# Patient Record
Sex: FEMALE | Race: WHITE | URBAN - METROPOLITAN AREA
[De-identification: names, ages, dates, MRNs, and addresses within clinical notes are randomized per-mention and may not be internally consistent; named-entity substitution may affect disease eponyms.]

---

## 2017-01-19 ENCOUNTER — ALLSCRIPTS OFFICE VISIT (OUTPATIENT)
Dept: OTHER | Facility: OTHER | Age: 82
End: 2017-01-19

## 2017-02-23 ENCOUNTER — ALLSCRIPTS OFFICE VISIT (OUTPATIENT)
Dept: OTHER | Facility: OTHER | Age: 82
End: 2017-02-23

## 2017-03-30 ENCOUNTER — ALLSCRIPTS OFFICE VISIT (OUTPATIENT)
Dept: OTHER | Facility: OTHER | Age: 82
End: 2017-03-30

## 2017-05-04 ENCOUNTER — ALLSCRIPTS OFFICE VISIT (OUTPATIENT)
Dept: OTHER | Facility: OTHER | Age: 82
End: 2017-05-04

## 2017-06-08 ENCOUNTER — ALLSCRIPTS OFFICE VISIT (OUTPATIENT)
Dept: OTHER | Facility: OTHER | Age: 82
End: 2017-06-08

## 2017-07-13 ENCOUNTER — ALLSCRIPTS OFFICE VISIT (OUTPATIENT)
Dept: OTHER | Facility: OTHER | Age: 82
End: 2017-07-13

## 2017-08-17 ENCOUNTER — ALLSCRIPTS OFFICE VISIT (OUTPATIENT)
Dept: OTHER | Facility: OTHER | Age: 82
End: 2017-08-17

## 2017-10-05 ENCOUNTER — ALLSCRIPTS OFFICE VISIT (OUTPATIENT)
Dept: OTHER | Facility: OTHER | Age: 82
End: 2017-10-05

## 2017-11-16 ENCOUNTER — ALLSCRIPTS OFFICE VISIT (OUTPATIENT)
Dept: OTHER | Facility: OTHER | Age: 82
End: 2017-11-16

## 2017-12-28 ENCOUNTER — GENERIC CONVERSION - ENCOUNTER (OUTPATIENT)
Dept: OTHER | Facility: OTHER | Age: 82
End: 2017-12-28

## 2017-12-28 ENCOUNTER — ALLSCRIPTS OFFICE VISIT (OUTPATIENT)
Dept: OTHER | Facility: OTHER | Age: 82
End: 2017-12-28

## 2018-02-01 ENCOUNTER — CLINICAL SUPPORT (OUTPATIENT)
Dept: CARDIOLOGY CLINIC | Facility: CLINIC | Age: 83
End: 2018-02-01
Payer: MEDICARE

## 2018-02-01 DIAGNOSIS — I49.5 SICK SINUS SYNDROME (HCC): Primary | ICD-10-CM

## 2018-02-01 DIAGNOSIS — Z95.0 PRESENCE OF CARDIAC PACEMAKER: ICD-10-CM

## 2018-02-01 PROCEDURE — 93293 PM PHONE R-STRIP DEVICE EVAL: CPT | Performed by: INTERNAL MEDICINE

## 2018-02-01 NOTE — PROGRESS NOTES
DEVICE IS PAULETTE  PT WILL BE SCHEDULED FOR H&P AND PAULETTE CONFIRMATION  NON-MAGNET= 100% A-V SEQUENTIAL PACING WITH CAPTURE  MAGNET= ASYNCHRONOUS A-V PACING WITH CAPTURE  POST-MAGNET= AV SEQUENTIAL PACING WITH CAPTURE  NORMAL DEVICE FUNCTION  DL     No current outpatient prescriptions on file

## 2018-02-22 RX ORDER — BUSPIRONE HYDROCHLORIDE 5 MG/1
TABLET ORAL
COMMUNITY
Start: 2014-12-01

## 2018-02-22 RX ORDER — DONEPEZIL HYDROCHLORIDE 10 MG/1
TABLET, FILM COATED ORAL
COMMUNITY
End: 2018-02-23 | Stop reason: ALTCHOICE

## 2018-02-22 RX ORDER — MIRTAZAPINE 30 MG/1
1 TABLET, FILM COATED ORAL
COMMUNITY
Start: 2013-03-11 | End: 2018-02-23 | Stop reason: ALTCHOICE

## 2018-02-22 RX ORDER — PROCHLORPERAZINE MALEATE 10 MG
TABLET ORAL
COMMUNITY
End: 2018-02-23 | Stop reason: ALTCHOICE

## 2018-02-22 RX ORDER — DILTIAZEM HYDROCHLORIDE 240 MG/1
CAPSULE, EXTENDED RELEASE ORAL DAILY
COMMUNITY
Start: 2013-02-20 | End: 2018-04-11 | Stop reason: HOSPADM

## 2018-02-22 RX ORDER — HYDRALAZINE HYDROCHLORIDE 25 MG/1
TABLET, FILM COATED ORAL
COMMUNITY
Start: 2014-12-06 | End: 2018-02-23 | Stop reason: ALTCHOICE

## 2018-02-22 RX ORDER — CLOTRIMAZOLE 1 %
CREAM (GRAM) TOPICAL
COMMUNITY
Start: 2015-03-25 | End: 2018-02-23 | Stop reason: ALTCHOICE

## 2018-02-22 RX ORDER — TRAMADOL HYDROCHLORIDE 50 MG/1
TABLET ORAL
COMMUNITY
End: 2018-02-23 | Stop reason: ALTCHOICE

## 2018-02-22 RX ORDER — LORATADINE 10 MG/1
TABLET ORAL
COMMUNITY

## 2018-02-22 RX ORDER — AMMONIUM LACTATE 12 G/100G
LOTION TOPICAL
COMMUNITY
Start: 2015-03-06 | End: 2018-02-23 | Stop reason: ALTCHOICE

## 2018-02-22 RX ORDER — GUAIFENESIN 100 MG/5ML
SOLUTION ORAL
COMMUNITY
End: 2018-02-23 | Stop reason: ALTCHOICE

## 2018-02-22 RX ORDER — ESCITALOPRAM OXALATE 10 MG/1
1 TABLET ORAL DAILY
COMMUNITY

## 2018-02-22 RX ORDER — CLOTRIMAZOLE AND BETAMETHASONE DIPROPIONATE 10; .64 MG/G; MG/G
CREAM TOPICAL
COMMUNITY
Start: 2014-12-12 | End: 2018-02-23 | Stop reason: ALTCHOICE

## 2018-02-22 RX ORDER — POTASSIUM CHLORIDE 20 MEQ/1
TABLET, EXTENDED RELEASE ORAL
COMMUNITY
Start: 2015-03-14 | End: 2018-02-23 | Stop reason: ALTCHOICE

## 2018-02-22 RX ORDER — CYCLOSPORINE 0.5 MG/ML
EMULSION OPHTHALMIC
COMMUNITY
End: 2018-02-23 | Stop reason: ALTCHOICE

## 2018-02-22 RX ORDER — ATORVASTATIN CALCIUM 20 MG/1
1 TABLET, FILM COATED ORAL DAILY
COMMUNITY
End: 2018-02-23 | Stop reason: ALTCHOICE

## 2018-02-22 RX ORDER — IPRATROPIUM BROMIDE AND ALBUTEROL SULFATE 2.5; .5 MG/3ML; MG/3ML
SOLUTION RESPIRATORY (INHALATION)
COMMUNITY
Start: 2015-01-21 | End: 2018-02-23 | Stop reason: ALTCHOICE

## 2018-02-22 RX ORDER — CERAMIDES 1,3,6-II
CREAM (GRAM) TOPICAL
COMMUNITY
End: 2018-02-23 | Stop reason: ALTCHOICE

## 2018-02-22 RX ORDER — METOPROLOL TARTRATE 100 MG/1
TABLET ORAL
COMMUNITY
Start: 2015-03-14 | End: 2018-02-23 | Stop reason: ALTCHOICE

## 2018-02-22 RX ORDER — ATORVASTATIN CALCIUM 40 MG/1
TABLET, FILM COATED ORAL
COMMUNITY
Start: 2014-12-01 | End: 2018-02-23 | Stop reason: ALTCHOICE

## 2018-02-22 RX ORDER — HYDRALAZINE HYDROCHLORIDE 50 MG/1
TABLET, FILM COATED ORAL
COMMUNITY
Start: 2014-11-12 | End: 2018-02-23 | Stop reason: ALTCHOICE

## 2018-02-22 RX ORDER — METHYLPHENIDATE HYDROCHLORIDE 5 MG/1
0.5 TABLET ORAL DAILY
COMMUNITY
Start: 2013-02-21 | End: 2018-02-23 | Stop reason: ALTCHOICE

## 2018-02-22 RX ORDER — SUCRALFATE 1 G/1
TABLET ORAL
COMMUNITY
End: 2018-02-23 | Stop reason: ALTCHOICE

## 2018-02-22 RX ORDER — METOPROLOL TARTRATE 100 MG/1
100 TABLET ORAL
COMMUNITY
Start: 2011-05-30

## 2018-02-22 RX ORDER — PANTOPRAZOLE SODIUM 40 MG/1
1 TABLET, DELAYED RELEASE ORAL 2 TIMES DAILY
COMMUNITY
End: 2018-02-23 | Stop reason: ALTCHOICE

## 2018-02-23 ENCOUNTER — CLINICAL SUPPORT (OUTPATIENT)
Dept: CARDIOLOGY CLINIC | Facility: CLINIC | Age: 83
End: 2018-02-23
Payer: MEDICARE

## 2018-02-23 ENCOUNTER — OFFICE VISIT (OUTPATIENT)
Dept: CARDIOLOGY CLINIC | Facility: CLINIC | Age: 83
End: 2018-02-23
Payer: MEDICARE

## 2018-02-23 VITALS — DIASTOLIC BLOOD PRESSURE: 68 MMHG | SYSTOLIC BLOOD PRESSURE: 102 MMHG | HEART RATE: 60 BPM | HEIGHT: 64 IN

## 2018-02-23 DIAGNOSIS — Z45.010 ENCNTR FOR CHECKING AND TEST OF CARD PACEMAKER PULSE GNRTR: ICD-10-CM

## 2018-02-23 DIAGNOSIS — I49.8 PACEMAKER-DEPENDENT DUE TO NATIVE CARDIAC RHYTHM INSUFFICIENT TO SUPPORT LIFE: ICD-10-CM

## 2018-02-23 DIAGNOSIS — I49.5 TACHY-BRADY SYNDROME (HCC): ICD-10-CM

## 2018-02-23 DIAGNOSIS — I49.5 SICK SINUS SYNDROME (HCC): Primary | ICD-10-CM

## 2018-02-23 DIAGNOSIS — Z95.0 PACEMAKER-DEPENDENT DUE TO NATIVE CARDIAC RHYTHM INSUFFICIENT TO SUPPORT LIFE: ICD-10-CM

## 2018-02-23 DIAGNOSIS — Z95.0 CARDIAC PACEMAKER IN SITU: ICD-10-CM

## 2018-02-23 DIAGNOSIS — Z45.010 PACEMAKER AT END OF BATTERY LIFE: Primary | ICD-10-CM

## 2018-02-23 PROCEDURE — 99204 OFFICE O/P NEW MOD 45 MIN: CPT | Performed by: INTERNAL MEDICINE

## 2018-02-23 PROCEDURE — 99024 POSTOP FOLLOW-UP VISIT: CPT | Performed by: INTERNAL MEDICINE

## 2018-02-23 PROCEDURE — 93000 ELECTROCARDIOGRAM COMPLETE: CPT | Performed by: INTERNAL MEDICINE

## 2018-02-23 RX ORDER — DILTIAZEM HYDROCHLORIDE 240 MG/1
240 CAPSULE, COATED, EXTENDED RELEASE ORAL DAILY
COMMUNITY

## 2018-02-23 RX ORDER — ASPIRIN 81 MG/1
81 TABLET ORAL DAILY
COMMUNITY

## 2018-02-23 NOTE — PROGRESS NOTES
[facility]E- NONBILLABLE  BATTERY INDICATED ERT 1/26/18  MONITORING WAS ONLY DONE VIA TTM'S PER NH/FAMILY'S REQUEST; LAST INTERRO DONE IN [facility] 1/25/15  AP-71%, -99%  ALL AVAILABLE LEAD PARAMETERS WITHIN NORMAL LIMITS  PT TO SEE DR Claire Mcpherson   GV

## 2018-02-23 NOTE — PROGRESS NOTES
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[address] )    -  Primary    Relevant Medications    diltiazem (DILACOR XR) 240 MG 24 hr capsule    metoprolol tartrate (LOPRESSOR) 100 mg tablet    diltiazem (CARDIZEM CD) 240 mg 24 hr capsule    Other Relevant Orders    POCT ECG          81 yo female  1) S/p [place] dual chamber PPM placed 2010, last full check in 2015 leads were ok and remotes w TTM show now at [facility]  99% V paced  2) Diastolic CHF EF normal 4978  3) Wheelchair bound in SNF, likely has dementia    Daughter here concerned about End of life decisions, if worth changing pacemaker    PLAN:  1  If she were interested in pursuing hospice/palliative care I would not reocmmend pacemaker change, but since they had not made that decision yet, seems worth proceeding w Gen change as she likely would not survive without pacemaker since 99% V paced  I did explain despite older age she should tolerate relatively  surgery of generator change w/o issue  Informed Consent: Risks, benefits, and alternatives to Implantable cardiac device surgery was  discussed with patient and any family present  The patient and/or the patients designated decision maker understands risks, which include but are not limited to life threatening  bleeding, infection, air around lungs, blood around the heart, stroke, open heart surgery, death and reoperation dislodged or malfunctioning device  Reoperation due to device dislodgment is a fairly common complication where more serious complications are rare         Orders Placed This Encounter   Procedures    POCT ECG     Medications Discontinued During This Encounter Medication Reason    ammonium lactate (LAC-HYDRIN) 12 % lotion Therapy completed    atorvastatin (LIPITOR) 20 mg tablet Therapy completed    atorvastatin (LIPITOR) 40 mg tablet Therapy completed    clotrimazole (LOTRIMIN) 1 % cream Therapy completed    clotrimazole-betamethasone (LOTRISONE) 1-0 05 % cream Therapy completed    conjugated estrogens (PREMARIN) 0 3 mg tablet Therapy completed    cycloSPORINE (RESTASIS) 0 05 % ophthalmic emulsion Therapy completed    donepezil (ARICEPT) 10 mg tablet Therapy completed    Emollient (CERAVE) CREA Therapy completed    Glycerin-Hypromellose- 0 2-0 2-1 % SOLN Therapy completed    guaiFENesin (ROBITUSSIN) 100 mg/5 mL oral solution Therapy completed    hydrALAZINE (APRESOLINE) 25 mg tablet Therapy completed    hydrALAZINE (APRESOLINE) 50 mg tablet Therapy completed    ipratropium-albuterol (DUO-NEB) 0 5-2 5 mg/3 mL Therapy completed    methylphenidate (RITALIN) 5 mg tablet Therapy completed    metoprolol tartrate (LOPRESSOR) 100 mg tablet Therapy completed    metoprolol tartrate (LOPRESSOR) 25 mg tablet Therapy completed    mirtazapine (REMERON) 30 mg tablet Therapy completed    pantoprazole (PROTONIX) 40 mg tablet Therapy completed    potassium chloride (K-DUR,KLOR-CON) 20 mEq tablet Therapy completed    prochlorperazine (COMPAZINE) 10 mg tablet Therapy completed    sucralfate (CARAFATE) 1 g tablet Therapy completed    traMADol (ULTRAM) 50 mg tablet Therapy completed             HPI/Subjective:     81 yo female  1) S/p [place] dual chamber PPM placed 2010, last full check in 2015 leads were ok and remotes w TTM show now at [facility]  99% V paced    2) Diastolic CHF EF normal 8542  3) Wheelchair bound in SNF, likely has dementia    Daughter here concerned about End of life decisions, if worth changing pacemaker    Please note HPI is listed by problem with with update following it, it is copied again in the assessment above and reflects medical decision making as well  Complete 12 point ROS reviewed and otherwise non pertinent or negative except as per HPI pertinent positives in Cardiovascular and Respiratory emphasized  Please see paper chart for outpatient clinic patients where the patient completed the 12 point ROS survey  No past medical history on file  Allergies   Allergen Reactions    Cephalexin     Hydrocodone-Acetaminophen     Propoxyphene     Zolpidem      I reviewed the Home Medication list and Allergies in the chart  Scheduled Meds:  Current Outpatient Prescriptions   Medication Sig Dispense Refill    aspirin (ECOTRIN LOW STRENGTH) 81 mg EC tablet Take 81 mg by mouth daily      busPIRone (BUSPAR) 5 mg tablet Take by mouth      diltiazem (CARDIZEM CD) 240 mg 24 hr capsule Take 240 mg by mouth daily      diltiazem (DILACOR XR) 240 MG 24 hr capsule Take by mouth Daily      escitalopram (LEXAPRO) 10 mg tablet Take 1 tablet by mouth daily      loratadine (CLARITIN) 10 mg tablet Take by mouth      metoprolol tartrate (LOPRESSOR) 100 mg tablet Take 100 mg by mouth         No current facility-administered medications for this visit  PRN Meds:  No family history on file  Social History     Social History    Marital status: /Civil Union     Spouse name: N/A    Number of children: N/A    Years of education: N/A     Occupational History    Not on file  Social History Main Topics    Smoking status: Not on file    Smokeless tobacco: Not on file    Alcohol use Not on file    Drug use: Unknown    Sexual activity: Not on file     Other Topics Concern    Not on file     Social History Narrative    No narrative on file         OBJECTIVE:    /68 (BP Location: Left arm, Patient Position: Sitting, Cuff Size: Large)   Pulse 60   Ht 5' 4" (1 626 m) There were no vitals filed for this visit    GEN: No acute distress, Alert but does not answer questions  HEENT:Head, neck, ears, oral pharynx: Mucus membranes moist, oral pharynx clear, nares clear  External ears normal  EYES: Pupils equal, sclera anicteric, midline, normal conjuctiva  NECK: No JVD, supple, no obvious masses or thryomegaly or goiter  CARDIOVASCULAR:  RRR, No murmur, rub, gallops S1,S2  LUNGS: Clear To auscultation bilaterally, normal effort, no rales, rhonchi, crackles  ABDOMEN:  nondistended,  without obvious organomegaly or ascites  EXTREMITIES/VASCULAR: 2+ pitting  Radial pulses intact, pedal pulses difficult to palpate, warm an well perfused  PSYCH: Normal Affect, no overt suicidal ideation, linear speech pattern without evidence of psychosis  NEURO: Grossly intact, moving all extremiteis equal, face symmetric, alert and responsive, no obvious focal defecits  GAIT: wheelchair bound  HEME: No bleeding, bruising, petechia, purpura  SKIN: No significant rashes, warm, no diaphoresis or pallor       Lab Results:     @RESULTRCNT(1M])@    CBC with diff:     CMP:    TSH:       Lipid Profile:          Cardiac testing:   Results for orders placed in visit on 01/25/15   Echo complete with contrast if indicated    Narrative [facility]   [facility], [address] [place] [address]   Phone: [phone]   TRANSTHORACIC ECHOCARDIOGRAM   2D, M-MODE, DOPPLER, AND COLOR DOPPLER   Study date:  2015   Patient: Nissa Nelson   MR number: [id number]   Account number: [de-identified]   : 1930   Age: 80 years   Gender: Female   Status: Inpatient   Location: Bedside   Height: 64 in   Weight: 174 7 lb   BP: 160/ 74 mmHg   Indications: Syncope   Diagnoses: 780 2 - SYNCOPE AND COLLAPSE   Sonographer:  GREGORIO Lezama   Primary Physician:  Hitesh Zaldivar DO   Referring Physician:  Reuben Hidalgo MD   Group:  [facility]   Interpreting Physician:  Rambo Ahmadi DO   SUMMARY   LEFT VENTRICLE:   Systolic function was normal  Ejection fraction was estimated in the range of   65 % to 70 %  There were no regional wall motion abnormalities  MITRAL VALVE:   There was mild regurgitation  AORTIC VALVE:   There was mild regurgitation  TRICUSPID VALVE:   There was mild regurgitation  AORTA:   The root exhibited mild dilatation  HISTORY: PRIOR HISTORY: HTN, HLD, Dementia, Pacemaker, Afib, Melanoma, Chest   pain   PROCEDURE: The procedure was performed at the bedside  This was a routine   TRANSTHORACIC ECHOCARDIOGRAM   Patient: Dino Diallo   MR number: [id number]    ------ Page 2   study  The transthoracic approach was used  The study included complete 2D   imaging, M-mode, complete spectral Doppler, and color Doppler  The heart rate   was 60 bpm, at the start of the study  Images were obtained from the   parasternal, apical, subcostal, and suprasternal notch acoustic windows  Echocardiographic views were limited due to lung interference  This was a   technically difficult study  LEFT VENTRICLE: Size was normal  Systolic function was normal  Ejection   fraction was estimated in the range of 65 % to 70 %  There were no regional   wall motion abnormalities  Wall thickness was normal  DOPPLER: Left    ventricular   diastolic function parameters were normal for the patient's age  RIGHT VENTRICLE: The size was normal  Systolic function was normal  Wall   thickness was normal  A pacing wire was present  LEFT ATRIUM: Size was normal    RIGHT ATRIUM: Size was normal    MITRAL VALVE: Valve structure was normal  There was mild thickening  There was   normal leaflet separation  DOPPLER: The transmitral velocity was within the   normal range  There was no evidence for stenosis  There was mild    regurgitation  AORTIC VALVE: The valve was trileaflet  Leaflets exhibited mildly increased   thickness, normal cuspal separation, and sclerosis  DOPPLER: Transaortic   velocity was within the normal range   There was no evidence for stenosis  There   was mild regurgitation  TRICUSPID VALVE: The valve structure was normal  There was normal leaflet   separation  DOPPLER: The transtricuspid velocity was within the normal range  There was no evidence for stenosis  There was mild regurgitation  Estimated   peak PA pressure was 35 mmHg  PULMONIC VALVE: Leaflets exhibited normal thickness, no calcification, and   normal cuspal separation  DOPPLER: The transpulmonic velocity was within the   normal range  There was no regurgitation  PERICARDIUM: There was no pericardial effusion  The pericardium was normal in   appearance  AORTA: The root exhibited mild dilatation  SYSTEMIC VEINS: IVC: The inferior vena cava was not well visualized  SYSTEM MEASUREMENT TABLES   2D   %FS: 51 55 %   AV Diam: 3 74 cm   EDV(Teich): 111 71 ml   TRANSTHORACIC ECHOCARDIOGRAM   Patient: Kb Horner   MR number: [id number]    ------ Page 3   EF(Teich): 82 62 %   ESV(Teich): 19 41 ml   IVSd: 0 99 cm   LA Area: 13 49 cm2   LA Diam: 3 12 cm   LVEDV MOD A4C: 61 72 ml   LVEF MOD A4C: 66 8 %   LVESV MOD A4C: 20 49 ml   LVIDd: 4 88 cm   LVIDs: 2 36 cm   LVLd A4C: 8 03 cm   LVLs A4C: 6 84 cm   LVPWd: 1 13 cm   RA Area: 17 98 cm2   RVIDd: 3 24 cm   SV MOD A4C: 41 23 ml   SV(Teich): 92 3 ml   CW   AR Dec Stanly: 2 37 m/s2   AR Dec Time: 2122 12 ms   AR PHT: 615 41 ms   AR Vmax: 5 03 m/s   AR maxP 26 mmHg   TR Vmax: 2 69 m/s   TR maxP 02 mmHg   MM   TAPSE: 2 51 cm   PW   MV A Hoang: 0 98 m/s   MV Dec Stanly: 3 19 m/s2   MV DecT: 235 97 ms   MV E Hoang: 0 75 m/s   MV E/A Ratio: 0 77   MV PHT: 68 43 ms   MVA By PHT: 3 21 cm2   [employer] [facility]redited [facility]   Prepared and electronically signed by   Phill Galicia DO   Signed 2015 15:29:57      No results found for this or any previous visit  No results found for this or any previous visit  No results found for this or any previous visit          I reviewed and interpreted the following LABS/EKG/TELE/IMAGING and below is summary of my interpretation (if data available): Interrogation of Pacemaker/Defibrillator/Loop (prior recorded events), etc: 2015 pacemaker check 99% VPaced DDD mode, lead paparemets appropriate   Today check she is in back up mode

## 2018-03-07 NOTE — PROGRESS NOTES
"  Discussion/Summary  Normal device function      Results/Data  Cardiac Device TTM [id number] 01:12PM Ochoa Castano     Test Name Result Flag Reference   Cardiac Electrophysiology Report      [id number] pdf   DEVICE TYPE Pacemaker     MISCELLANEOUS COMMENT (Report)     1ST STAGE, CONTINUE TO MONITOR VIA TTMS  NON-MAGNET= A-V SEQUENTIAL PACING WITH CAPTURE WITH SOME VENTRICULAR PACING  MAGNET= ASYNCHRONOUS A-V PACING WITH CAPTURE  POST-MAGNET= AV SEQUENTIAL PACING WITH CAPTURE WITH SOME VENTRICULAR PACING  NORMAL DEVICE FUNCTION   DL     Signatures   Electronically signed by : Sanya Walker, ; May  4 2017  9:21AM EST                       (Author)    Electronically signed by : Jose Greenfield DO; May  5 2017  6:07PM EST                       (Author)    "

## 2018-03-07 NOTE — PROGRESS NOTES
"  Discussion/Summary  Normal device function      Results/Data  Cardiac Device TTM [id number] 01:33PM Aurelio Morales     Test Name Result Flag Reference   Cardiac Electrophysiology Report      [id number]  pdf   DEVICE TYPE Pacemaker     MISCELLANEOUS COMMENT (Report)     1ST STAGE, CONTINUE TO MONITOR VIA TTMS  NON-MAGNET= A-V SEQUENTIAL PACING WITH CAPTURE WITH VENTRICULAR PACING  MAGNET= ASYNCHRONOUS A-V PACING WITH CAPTURE  POST-MAGNET= AV SEQUENTIAL PACING WITH CAPTURE WITH VENTRICULAR PACING AND RARE INTRINSIC BEATS  NORMAL DEVICE FUNCTION   DL     Signatures   Electronically signed by : Nicanor Gee, ; Jul 13 2017  9:40AM EST                       (Author)    Electronically signed by : DANIEL Ellison ; Jul 13 2017  4:52PM EST                       (Author)    "

## 2018-03-07 NOTE — PROGRESS NOTES
"  Discussion/Summary  Normal device function      Results/Data  Cardiac Device TTM [id number] 03:13PM Shelly Vasques     Test Name Result Flag Reference   Cardiac Electrophysiology Report      [id number]  pdf   DEVICE TYPE Pacemaker     MISCELLANEOUS COMMENT (Report)     NON-MAGNET= A-V SEQUENTIAL PACING WITH CAPTURE WITH RARE VENTRICULAR PACING AND INTRINSIC BEATS  MAGNET= ASYNCHRONOUS A-V PACING WITH CAPTURE  POST-MAGNET= AV SEQUENTIAL PACING WITH CAPTURE WITH RARE VENTRICULAR PACING AND INTRINSIC BEATS  NORMAL DEVICE FUNCTION   DL     Signatures   Electronically signed by : Meeta López, ; Sep 22 2016 11:28AM EST                       (Author)    Electronically signed by : DANIEL Francisco ; Sep 22 2016 12:15PM EST                       (Author)    "

## 2018-03-07 NOTE — PROGRESS NOTES
"  Discussion/Summary  Normal device function      Results/Data  Cardiac Device TTM [id number] 03:41PM Narda Miller     Test Name Result Flag Reference   Cardiac Electrophysiology Report      [id number] pdf   DEVICE TYPE Pacemaker     MISCELLANEOUS COMMENT (Report)     1ST STAGE, CONTINUE TO MONITOR VIA TTMS  NON-MAGNET= A-V SEQUENTIAL PACING WITH CAPTURE WITH SOME VENTRICULAR PACING  MAGNET= ASYNCHRONOUS A-V PACING WITH CAPTURE  POST-MAGNET= AV SEQUENTIAL PACING WITH CAPTURE WITH SOME VENTRICULAR PACING  NORMAL DEVICE FUNCTION   DL     Signatures   Electronically signed by : Salina Berry, ; Jan 19 2017 10:51AM EST                       (Author)    Electronically signed by : Sushila Kaur DO; Jan 22 2017  5:36PM EST                       (Author)    "

## 2018-03-07 NOTE — PROGRESS NOTES
"  Discussion/Summary  Normal device function      Results/Data  Cardiac Device TTM [id number] 01:28PM Yenni Polanco     Test Name Result Flag Reference   Cardiac Electrophysiology Report      [id number]  pdf   DEVICE TYPE Pacemaker     MISCELLANEOUS COMMENT (Report)     1ST STAGE, CONTINUE TO MONITOR VIA TTMS  NON-MAGNET= A-V SEQUENTIAL PACING WITH CAPTURE ,VENTRICULAR PACING AND PVCs  MAGNET= ASYNCHRONOUS A-V PACING WITH CAPTURE  POST-MAGNET= AV SEQUENTIAL PACING WITH CAPTURE AND VENTRICULAR PACING  NORMAL DEVICE FUNCTION   DL     Signatures   Electronically signed by : Freda Toledo, ; Aug 17 2017  9:38AM EST                       (Author)    Electronically signed by : DANIEL Gross ; Aug 17 2017  1:18PM EST                       (Author)    "

## 2018-03-07 NOTE — PROGRESS NOTES
"  Discussion/Summary  Normal device function      Results/Data  Cardiac Device TTM [id number] 01:28PM Nely Hogan     Test Name Result Flag Reference   Cardiac Electrophysiology Report      [id number] pdf   DEVICE TYPE Pacemaker     MISCELLANEOUS COMMENT      1ST STAGE, CONTINUE TO MONITOR VIA TTMS  NON-MAGNET= 100% A-V SEQUENTIAL PACING WITH CAPTURE  MAGNET= ASYNCHRONOUS A-V PACING WITH CAPTURE  POST-MAGNET= AV SEQUENTIAL PACING WITH CAPTURE WITH SOME VENTRICULAR PACING  NORMAL DEVICE FUNCTION   DL     Signatures   Electronically signed by : Jey Guzman, ; Jun 8 2017  9:40AM EST                       (Author)    Electronically signed by : DANIEL Taylor ; Jun 8 2017  2:49PM EST                       (Author)    "

## 2018-03-07 NOTE — PROGRESS NOTES
"  Discussion/Summary  Normal device function      Results/Data  Cardiac Device TTM [id number] 03:12PM Mishel Green     Test Name Result Flag Reference   Cardiac Electrophysiology Report      [id number]  pdf   DEVICE TYPE Pacemaker     MISCELLANEOUS COMMENT (Report)     1ST STAGE, CONTINUE TO MONITOR VIA TTMS  NON-MAGNET= A-V SEQUENTIAL PACING WITH CAPTURE WITH ATRIAL AND VENTRICULAR PACING  MAGNET= ASYNCHRONOUS A-V PACING WITH CAPTURE  POST-MAGNET= AV SEQUENTIAL PACING WITH CAPTURE WITH ATRIAL AND VENTRICULAR PACING  NORMAL DEVICE FUNCTION   DL     Signatures   Electronically signed by : Nicanor Gee, ; Oct 27 2016 11:26AM EST                       (Author)    Electronically signed by : Rolfe Angelucci, M D ; Oct 27 2016  9:41PM EST                       (Author)    "

## 2018-03-07 NOTE — PROGRESS NOTES
"  Discussion/Summary  Normal device function      Results/Data  Results   Cardiac Device TTM [id number] 03:14PM Xin Miranda     Test Name Result Flag Reference   Cardiac Electrophysiology Report      [id number]  pdf   DEVICE TYPE Pacemaker     MISCELLANEOUS COMMENT      NON-MAGNET= A-V SEQUENTIAL PACING WITH CAPTURE WITH RARE VENTRICULAR PACING  MAGNET= ASYNCHRONOUS A-V PACING WITH CAPTURE  POST-MAGNET= AV SEQUENTIAL PACING WITH CAPTURE WITH RARE VENTRICULAR PACING  NORMAL DEVICE FUNCTION   DL/CABA     Signatures   Electronically signed by : Haritha Kenyon, ; Jul 14 2016 12:10PM EST                       (Author)    Electronically signed by : DANIEL Shearer ; Jul 14 2016  1:09PM EST                       (Author)    "

## 2018-03-07 NOTE — PROGRESS NOTES
"  Discussion/Summary  Normal device function      Results/Data  Cardiac Device TTM [id number] 03:22PM Margarita Granados     Test Name Result Flag Reference   Cardiac Electrophysiology Report      [id number]  pdf   DEVICE TYPE Pacemaker     MISCELLANEOUS COMMENT (Report)     1ST STAGE, CONTINUE TO MONITOR VIA TTMS  NON-MAGNET= A-V SEQUENTIAL PACING WITH CAPTURE WITH INTRINSIC BEATS   MAGNET= ASYNCHRONOUS A-V PACING WITH CAPTURE  POST-MAGNET= AV SEQUENTIAL PACING WITH CAPTURE WITH INTRINSIC BEATS  NORMAL DEVICE FUNCTION   DL/CABA     Signatures   Electronically signed by : Alanna Duane, ; Aug 23 2016  9:21AM EST                       (Author)    Electronically signed by : Hema Parker DO; Sep  4 2016 11:48AM EST                       (Author)    "

## 2018-03-07 NOTE — PROGRESS NOTES
"  Discussion/Summary  Normal device function      Results/Data  Results   Cardiac Device TTM [id number] 03:15PM Leanna Guzmán     Test Name Result Flag Reference   Cardiac Electrophysiology Report      [id number]  pdf   DEVICE TYPE Pacemaker     MISCELLANEOUS COMMENT (Report)     NON-MAGNET= ATRIAL FIBRILLATION WITH CONTROLLED VENTRICULAR RESPONSE  MAGNET= ASYNCHRONOUS A-V PACING WITH CAPTURE HOWEVER UNABLE TO MAINTAIN DUAL SPIKES DURING MAGNET APPLICATION  POST-MAGNET= ATRIAL FIB WITH CONTROLLED VENTRICULAR RESPONSE  NORMAL DEVICE FUNCTION   DL/CABA     Signatures   Electronically signed by : Dianna Sosa, ; Apr 22 2016  3:26PM EST                       (Author)    Electronically signed by : Mahendra Zaldivar DO; Jun 5 2016  7:23PM EST                       (Author)    "

## 2018-03-07 NOTE — PROGRESS NOTES
"  Discussion/Summary  Normal device function      Results/Data  Cardiac Device TTM [id number] 01:26PM King Schneider     Test Name Result Flag Reference   Cardiac Electrophysiology Report      [id number]  pdf   DEVICE TYPE Pacemaker     MISCELLANEOUS COMMENT      1ST STAGE, CONTINUE TO MONITOR VIA TTMS  NON-MAGNET= NSR WITH INTACT CONDUCTION WITH VENTRICULAR PACING  MAGNET= ASYNCHRONOUS A-V PACING WITH CAPTURE  POST-MAGNET= NSR WITH INTACT CONDUCTION WITH SOME VENTRICULAR AND AV PACING  NORMAL DEVICE FUNCTION  DL     Signatures   Electronically signed by : Bandar Serna, ; Mar 30 2017  9:35AM EST                       (Author)    Electronically signed by : Satinder Estrada DO;  Apr 1 2017  4:46PM EST                       (Author)    "

## 2018-03-07 NOTE — PROGRESS NOTES
"  Discussion/Summary  Normal device function      Results/Data  Results   Cardiac Device TTM [id number] 02:59PM Dominic Ritter     Test Name Result Flag Reference   Cardiac Electrophysiology Report      [id number]  pdf   DEVICE TYPE Pacemaker     MISCELLANEOUS COMMENT      NON-MAGNET=A-V SEQUENTIAL PACING WITH CAPTURE WITH SOME VENTRICULAR PACING  MAGNET= ASYNCHRONOUS A-V PACING WITH CAPTURE  POST-MAGNET= AV SEQUENTIAL PACING WITH CAPTURE WITH SOME VENTRICULAR PACING  NORMAL DEVICE FUNCTION   DL/NC     Signatures   Electronically signed by : Rita Urbina, ; Mar  3 2016 10:08AM EST                       (Author)    Electronically signed by : DANIEL Aquino ; Mar  3 2016 10:57AM EST                       (Author)    "

## 2018-03-07 NOTE — PROGRESS NOTES
"  Discussion/Summary  Normal device function      Results/Data  Cardiac Device TTM [id number] 04:12PM Meka Soriano     Test Name Result Flag Reference   Cardiac Electrophysiology Report      [id number]  pdf   DEVICE TYPE Pacemaker     MISCELLANEOUS COMMENT      1ST STAGE, CONTINUE TO MONITOR VIA TTMS  NON-MAGNET= 100% A-V SEQUENTIAL PACING WITH CAPTURE  MAGNET= ASYNCHRONOUS A-V PACING WITH CAPTURE  POST-MAGNET= AV SEQUENTIAL PACING WITH CAPTURE AND RARE VENTRICULAR PACING   dl     Signatures   Electronically signed by : Salina Berry, ; Nov 16 2017 11:20AM EST                       (Author)    Electronically signed by : DANIEL Harper ; Nov 16 2017  1:15PM EST                       (Author)    "

## 2018-03-07 NOTE — PROGRESS NOTES
"  Discussion/Summary  Normal device function      Results/Data  Results   Cardiac Device TTM [id number] 02:53PM Dafne Marcelo     Test Name Result Flag Reference   Cardiac Electrophysiology Report      [id number]  pdf   DEVICE TYPE Pacemaker     MISCELLANEOUS COMMENT (Report)     NON-MAGNET= ATRIAL FIBRILLATION WITH CONTROLLED VENTRICULAR RESPONSE  MAGNET= ASYNCHRONOUS A-V PACING WITH CAPTURE HOWEVER UNABLE TO ACHIEVE ANY SPIKES DURING MAGNET APPLICATION BUT ACHIEVED MAGNET RATE  POST-MAGNET= ATRIAL FIB WITH CONTROLLED VENTRICULAR RESPONSE  NORMAL DEVICE FUNCTION   DL/CABA     Signatures   Electronically signed by : Stewart Steel, ; Jun 2 2016  2:10PM EST                       (Author)    Electronically signed by : DANIEL Squires ; Jun 2 2016  5:44PM EST                       (Author)    "

## 2018-03-07 NOTE — PROGRESS NOTES
"  Discussion/Summary  Normal device function      Results/Data  Cardiac Device TTM [id number] 03:18PM Will Lobstein     Test Name Result Flag Reference   Cardiac Electrophysiology Report      [id number]  pdf   DEVICE TYPE Pacemaker     MISCELLANEOUS COMMENT      1ST STAGE, CONTINUE TO MONITOR VIA TTMS  NON-MAGNET= A-V SEQUENTIAL PACING WITH CAPTURE AND RARE VENTRICULAR PACING  MAGNET= ASYNCHRONOUS A-V PACING WITH CAPTURE  POST-MAGNET= AV SEQUENTIAL PACING WITH CAPTURE  NORMAL DEVICE FUNCTION   DL     Signatures   Electronically signed by : Nydia Bhagat, ; Oct  5 2017 11:32AM EST                       (Author)    Electronically signed by : DANIEL Marmolejo ; Oct 29 2017 11:38PM EST                       (Author)    "

## 2018-03-07 NOTE — PROGRESS NOTES
"  Discussion/Summary  Normal device function      Results/Data  Cardiac Device TTM [id number] 03:31PM Dedra [place]     Test Name Result Flag Reference   Cardiac Electrophysiology Report      [id number] pdf   DEVICE TYPE Pacemaker     MISCELLANEOUS COMMENT (Report)     1ST STAGE, CONTINUE TO MONITOR VIA TTMS  NON-MAGNET= ATRIAL PACING WITH INTACT A-V CONDUCTION WITH AV PACING  MAGNET= ASYNCHRONOUS A-V PACING WITH CAPTURE  POST-MAGNET= ATRIAL PACING WITH INTACT CONDUCTION WITH AV PACING AND RARE INTRINSIC BEATS  NORMAL DEVICE FUNCTION   DL     Signatures   Electronically signed by : Kenny Hawley, ; Feb 23 2017 10:39AM EST                       (Author)    Electronically signed by : DANIEL Oconnell ; Feb 23 2017 12:41PM EST                       (Author)    "

## 2018-03-07 NOTE — PROGRESS NOTES
"  Discussion/Summary  Normal device function      Results/Data  Cardiac Device TTM [id number] 04:17PM Neri Burden     Test Name Result Flag Reference   Cardiac Electrophysiology Report      [id number]  pdf   DEVICE TYPE Pacemaker     MISCELLANEOUS COMMENT      1ST STAGE, CONTINUE TO MONITOR VIA TTMS  NON-MAGNET= 100% A-V SEQUENTIAL PACING WITH CAPTURE  MAGNET= ASYNCHRONOUS A-V PACING WITH CAPTURE  POST-MAGNET= AV SEQUENTIAL PACING WITH CAPTURE  NORMAL DEVICE FUNCTION   DL     Signatures   Electronically signed by : Haritha Kenyon, ; Dec  8 2016 11:25AM EST                       (Author)    Electronically signed by : Alonzo Hoover DO; Dec 10 2016  1:10PM EST                       (Author)    "

## 2018-04-10 ENCOUNTER — TELEPHONE (OUTPATIENT)
Dept: INPATIENT UNIT | Facility: HOSPITAL | Age: 83
End: 2018-04-10

## 2018-04-10 RX ORDER — VANCOMYCIN HYDROCHLORIDE 1 G/200ML
1000 INJECTION, SOLUTION INTRAVENOUS ONCE
Status: CANCELLED | OUTPATIENT
Start: 2018-04-10 | End: 2018-04-10

## 2018-04-11 ENCOUNTER — HOSPITAL ENCOUNTER (OUTPATIENT)
Dept: NON INVASIVE DIAGNOSTICS | Facility: HOSPITAL | Age: 83
Discharge: NURSING, REHAB OR LONG-TERM CARE | End: 2018-04-11
Attending: INTERNAL MEDICINE | Admitting: INTERNAL MEDICINE
Payer: MEDICARE

## 2018-04-11 ENCOUNTER — ANESTHESIA EVENT (OUTPATIENT)
Dept: SURGERY | Facility: HOSPITAL | Age: 83
End: 2018-04-11
Payer: MEDICARE

## 2018-04-11 ENCOUNTER — ANESTHESIA (OUTPATIENT)
Dept: SURGERY | Facility: HOSPITAL | Age: 83
End: 2018-04-11
Payer: MEDICARE

## 2018-04-11 VITALS
DIASTOLIC BLOOD PRESSURE: 72 MMHG | RESPIRATION RATE: 16 BRPM | TEMPERATURE: 97.4 F | SYSTOLIC BLOOD PRESSURE: 153 MMHG | HEART RATE: 60 BPM | OXYGEN SATURATION: 94 %

## 2018-04-11 DIAGNOSIS — Z45.010 ENCOUNTER FOR PACEMAKER AT END OF BATTERY LIFE: ICD-10-CM

## 2018-04-11 LAB
ANION GAP SERPL CALCULATED.3IONS-SCNC: 7 MMOL/L (ref 4–13)
ATRIAL RATE: 58 BPM
BUN SERPL-MCNC: 33 MG/DL (ref 5–25)
CALCIUM SERPL-MCNC: 9.2 MG/DL
CHLORIDE SERPL-SCNC: 107 MMOL/L (ref 100–108)
CO2 SERPL-SCNC: 27 MMOL/L (ref 21–32)
CREAT SERPL-MCNC: 1.87 MG/DL (ref 0.6–1.3)
ERYTHROCYTE [DISTWIDTH] IN BLOOD BY AUTOMATED COUNT: 14.1 % (ref 11.6–15.1)
GFR SERPL CREATININE-BSD FRML MDRD: 24 ML/MIN/1.73SQ M
GLUCOSE P FAST SERPL-MCNC: 98 MG/DL (ref 65–99)
GLUCOSE SERPL-MCNC: 98 MG/DL (ref 65–140)
HCT VFR BLD AUTO: 38.3 % (ref 34.8–46.1)
HGB BLD-MCNC: 12.5 G/DL (ref 11.5–15.4)
INR PPP: 1.08 (ref 0.86–1.16)
MCH RBC QN AUTO: 30.6 PG (ref 26.8–34.3)
MCHC RBC AUTO-ENTMCNC: 32.6 G/DL (ref 31.4–37.4)
MCV RBC AUTO: 94 FL (ref 82–98)
PLATELET # BLD AUTO: 317 THOUSANDS/UL (ref 149–390)
PMV BLD AUTO: 9 FL (ref 8.9–12.7)
POTASSIUM SERPL-SCNC: 4.1 MMOL/L (ref 3.5–5.3)
PROTHROMBIN TIME: 14 SECONDS (ref 12.1–14.4)
QRS AXIS: -78 DEGREES
QRSD INTERVAL: 188 MS
QT INTERVAL: 528 MS
QTC INTERVAL: 528 MS
RBC # BLD AUTO: 4.08 MILLION/UL (ref 3.81–5.12)
SODIUM SERPL-SCNC: 141 MMOL/L (ref 136–145)
T WAVE AXIS: 96 DEGREES
VENTRICULAR RATE: 60 BPM
WBC # BLD AUTO: 9.24 THOUSAND/UL (ref 4.31–10.16)

## 2018-04-11 PROCEDURE — 33228 REMV&REPLC PM GEN DUAL LEAD: CPT | Performed by: INTERNAL MEDICINE

## 2018-04-11 PROCEDURE — 93005 ELECTROCARDIOGRAM TRACING: CPT | Performed by: PHYSICIAN ASSISTANT

## 2018-04-11 PROCEDURE — 85027 COMPLETE CBC AUTOMATED: CPT | Performed by: PHYSICIAN ASSISTANT

## 2018-04-11 PROCEDURE — C1785 PMKR, DUAL, RATE-RESP: HCPCS

## 2018-04-11 PROCEDURE — 85610 PROTHROMBIN TIME: CPT | Performed by: PHYSICIAN ASSISTANT

## 2018-04-11 PROCEDURE — 80048 BASIC METABOLIC PNL TOTAL CA: CPT | Performed by: PHYSICIAN ASSISTANT

## 2018-04-11 PROCEDURE — 93010 ELECTROCARDIOGRAM REPORT: CPT | Performed by: INTERNAL MEDICINE

## 2018-04-11 RX ORDER — EPHEDRINE SULFATE 50 MG/ML
INJECTION, SOLUTION INTRAVENOUS AS NEEDED
Status: DISCONTINUED | OUTPATIENT
Start: 2018-04-11 | End: 2018-04-11 | Stop reason: SURG

## 2018-04-11 RX ORDER — MECLIZINE HYDROCHLORIDE 25 MG/1
25 TABLET ORAL ONCE
COMMUNITY
Start: 2018-04-11 | End: 2018-04-11

## 2018-04-11 RX ORDER — LIDOCAINE HYDROCHLORIDE 10 MG/ML
INJECTION, SOLUTION INFILTRATION; PERINEURAL AS NEEDED
Status: DISCONTINUED | OUTPATIENT
Start: 2018-04-11 | End: 2018-04-11 | Stop reason: SURG

## 2018-04-11 RX ORDER — IBUPROFEN 600 MG/1
600 TABLET ORAL EVERY 6 HOURS PRN
Status: DISCONTINUED | OUTPATIENT
Start: 2018-04-11 | End: 2018-04-11 | Stop reason: HOSPADM

## 2018-04-11 RX ORDER — PROPOFOL 10 MG/ML
INJECTION, EMULSION INTRAVENOUS CONTINUOUS PRN
Status: DISCONTINUED | OUTPATIENT
Start: 2018-04-11 | End: 2018-04-11 | Stop reason: SURG

## 2018-04-11 RX ORDER — LIDOCAINE HYDROCHLORIDE 10 MG/ML
INJECTION, SOLUTION INFILTRATION; PERINEURAL CODE/TRAUMA/SEDATION MEDICATION
Status: COMPLETED | OUTPATIENT
Start: 2018-04-11 | End: 2018-04-11

## 2018-04-11 RX ORDER — SODIUM CHLORIDE 9 MG/ML
INJECTION, SOLUTION INTRAVENOUS CONTINUOUS PRN
Status: DISCONTINUED | OUTPATIENT
Start: 2018-04-11 | End: 2018-04-11 | Stop reason: SURG

## 2018-04-11 RX ORDER — GENTAMICIN SULFATE 40 MG/ML
INJECTION, SOLUTION INTRAMUSCULAR; INTRAVENOUS CODE/TRAUMA/SEDATION MEDICATION
Status: COMPLETED | OUTPATIENT
Start: 2018-04-11 | End: 2018-04-11

## 2018-04-11 RX ORDER — FENTANYL CITRATE 50 UG/ML
INJECTION, SOLUTION INTRAMUSCULAR; INTRAVENOUS AS NEEDED
Status: DISCONTINUED | OUTPATIENT
Start: 2018-04-11 | End: 2018-04-11 | Stop reason: SURG

## 2018-04-11 RX ORDER — VANCOMYCIN HYDROCHLORIDE 1 G/200ML
1000 INJECTION, SOLUTION INTRAVENOUS ONCE
Status: COMPLETED | OUTPATIENT
Start: 2018-04-11 | End: 2018-04-11

## 2018-04-11 RX ADMIN — EPHEDRINE SULFATE 5 MG: 50 INJECTION, SOLUTION INTRAMUSCULAR; INTRAVENOUS; SUBCUTANEOUS at 09:29

## 2018-04-11 RX ADMIN — VANCOMYCIN HYDROCHLORIDE 1000 MG: 1 INJECTION, SOLUTION INTRAVENOUS at 08:53

## 2018-04-11 RX ADMIN — PROPOFOL 80 MCG/KG/MIN: 10 INJECTION, EMULSION INTRAVENOUS at 08:55

## 2018-04-11 RX ADMIN — GENTAMICIN SULFATE 80 MG: 40 INJECTION, SOLUTION INTRAMUSCULAR; INTRAVENOUS at 09:22

## 2018-04-11 RX ADMIN — SODIUM CHLORIDE: 0.9 INJECTION, SOLUTION INTRAVENOUS at 08:03

## 2018-04-11 RX ADMIN — LIDOCAINE HYDROCHLORIDE 20 ML: 10 INJECTION, SOLUTION INFILTRATION; PERINEURAL at 09:15

## 2018-04-11 RX ADMIN — FENTANYL CITRATE 25 MCG: 50 INJECTION, SOLUTION INTRAMUSCULAR; INTRAVENOUS at 09:00

## 2018-04-11 RX ADMIN — LIDOCAINE HYDROCHLORIDE 50 MG: 10 INJECTION, SOLUTION INFILTRATION; PERINEURAL at 08:59

## 2018-04-11 NOTE — DISCHARGE INSTRUCTIONS
Please refer to post pacemaker implantation discharge instructions and restrictions and your pacemaker booklet/temporary card  Keep incision dry for one week  Do not use lotions/powders/creams on incision  Remove outer bandage 48 hours after implantation  Leave underlying steri-strips in place, they will either fall off on their own or will be removed at 2 week follow up appointment  Please call the office if you notice redness, swelling, bleeding, or drainage from incision or if you develop fevers  AFTER PACEMAKER CARE:    If you have any questions, please call [phone] to speak with a nurse (8:30am-4pm, or [phone] after hours)  For appointments, please call [phone]  WHAT YOU SHOULD KNOW:   A pacemaker is a small, battery-powered device that is placed under your skin in your upper chest area with wires placed through a vein that lead directly into the heart  It helps regulate your heart rate and prevent your heart from beating too slowly                  AFTER YOU LEAVE:     Medicines:     · Pain medicine: You may need medicine to take away or decrease pain  ¨ Learn how to take your medicine  Ask what medicine and how much you should take  Be sure you know how, when, and how often to take it  Usually Over the counter pain medicine is sufficient to control pain (Acetominophen or Ibuprofen) Ask your doctor if you may take these  If this does not control your pain, narcotic pain killers may be prescribed, please call if you need prescription  ¨ Do not wait until the pain is severe before you take your medicine  Tell caregivers if your pain does not decrease  ¨ Pain medicine can make you dizzy or sleepy  Prevent falls by calling someone when you get out of bed or if you need help  Take your medicine as directed  Call your healthcare provider if you think your medicine is not helping or if you have side effects  Tell him if you are allergic to any medicine      Follow up with your cardiologist after your procedure: You will need a follow-up visit approximately 2 weeks after you leave the hospital  Your cardiologist will check your wound and make sure that your pacemaker is working correctly  Follow the instructions to check your pacemaker: Your cardiologist or primary healthcare provider will check your pacemaker and the battery regularly  He will use a computer to check your pacemaker over the telephone or wireless device which will be given to you  Pacemaker batteries usually last 5 to 10 years  The pacemaker unit will be replaced when the battery gets low  This is a simpler procedure than the original one to implant your pacemaker  Wound care:  Keep your incision dry for one week  Sponge/tub baths are preferred, try to avoid a shower for 7 days  Do not use lotions/powders/creams on incision  Remove outer bandage 48 hours after implantation  Leave underlying steri-strips in place, they will either fall off on their own or will be removed at 2 week follow up appointment  Please call the office if you notice redness, swelling, bleeding, or drainage from incision or if you develop fevers  Activity:   · You may resume your normal activity following a generator change  · Typically driving is allowed after 1 week post pacemaker if you are stable, however in some cases it may be longer and you should discuss with your doctor before proceeding  · Sports:  Ask your caregiver when it is okay to play tennis, golf, basketball, or any sport that requires you to lift your arms  Do not play full contact sports, such as football, that could damage your pacemaker  Ask your cardiologist or primary healthcare provider how much and what kinds of physical activity are safe for you  Living with a pacemaker:   · Tell all caregivers you have a pacemaker: This includes surgeons, radiologists, and medical technicians   You may want to wear a medical alert ID bracelet or necklace that states that you have a pacemaker  · Carry your pacemaker ID card: Make sure you receive a pacemaker ID card  Carry it with you at all times  It lists important information about your pacemaker  Show it to airport security if you travel  · Avoid electrical interference:  Avoid welding equipment and other equipment with large magnets or electric fields  These things could interfere with how your pacemaker works  Use your cell phone on the ear opposite from your pacemaker  Do not carry your cell phone in your shirt pocket over your chest      · Some Pacemakers are MRI safe  Ask you doctor if it is safe to proceed with MRI and let the radiologist and staff know you have a pacemaker  · Do not touch the skin around your pacemaker: This can cause damage to the lead wires or move the pacemaker unit from where it should be  Contact your cardiologist or primary healthcare provider if:   · The area around your pacemaker has increasing amount of pain after surgery  The pain should improve over first few days after implantation  · The skin around your stitches has increasing redness, swelling, or has drainage  This may mean that you have an infection  · You have a fever  · You have chills, a cough, and feel weak or achy  These are also signs of infection  · Your feet or ankles are more swollen than your baseline  · Your Heart rate is less than 50 beats per minute     Seek care immediately if:   · Your bandage becomes soaked with blood  · Your pacemaker is swelling rapidly    · Your stitches open up  · You feel your heart suddenly beating very slowly or quickly  · You become too weak or dizzy to stand, or you pass out  · Your arm or leg feels warm, tender, and painful  It may look swollen and red  · You have chest pain that does not go away with rest or medicine  · You feel lightheaded, short of breath, and have chest pain  · You cough up blood          © 2014 [employer] is for End User's use only and may not be sold, redistributed or otherwise used for commercial purposes  All illustrations and images included in CareNotes® are the copyrighted property of A D A M [employer] Inc  or Jace Lawrence  The above information is an  only  It is not intended as medical advice for individual conditions or treatments  Talk to your doctor, nurse or pharmacist before following any medical regimen to see if it is safe and effective for you

## 2018-04-11 NOTE — H&P
H&P Exam - Cardiology   Fransisca Hooper 80 y o  female MRN: [id number]  Unit/Bed#: [facility] OVR Encounter: [id number]    Assessment/Plan     Assessment:  1  [employer] dual chamber pacemaker in situ, currently at PAULETTE   A ) per prior reports, 99% V paced  2  Diastolic CHF, preserved LVEF per echo 2015  3  Wheelchair bound    Plan:  1  Pacemaker generator change, will likely change to [employer] device  History of Present Illness   HPI:  Fransisca Hooper is a 80y o  year old female with a history of bradycardia with BSX dual chamber pacemaker in situ and preserved LV systolic function  She was seen several months ago by Dr Mark Aguilar  Through routine device interrogations, she was found to have reached PAULETTE  It was decided that she would undergo a generator change, and she presents to undergo this procedure  No changes since she was last seen in the office  She was car sick this morning on the ride to the hospital, but currently resting comfortably  Review of Systems  ROS as noted above, otherwise 12 point review of systems was performed and is negative  Historical Information   No past medical history on file  No past surgical history on file  Family History: No family history on file      Social History   History   Alcohol use Not on file     History   Drug use: Unknown     History   Smoking Status    Not on file   Smokeless Tobacco    Not on file         Meds/Allergies   all medications and allergies reviewed  Home Medications:   Prescriptions Prior to Admission   Medication    aspirin (ECOTRIN LOW STRENGTH) 81 mg EC tablet    busPIRone (BUSPAR) 5 mg tablet    diltiazem (CARDIZEM CD) 240 mg 24 hr capsule    escitalopram (LEXAPRO) 10 mg tablet    loratadine (CLARITIN) 10 mg tablet    meclizine (ANTIVERT) 25 mg tablet    metoprolol tartrate (LOPRESSOR) 100 mg tablet    diltiazem (DILACOR XR) 240 MG 24 hr capsule       Allergies   Allergen Reactions    Cephalexin     Hydrocodone-Acetaminophen  Propoxyphene     Zolpidem        Objective   Vitals: There were no vitals taken for this visit  No intake or output data in the 24 hours ending 18 0825    Invasive Devices     Peripheral Intravenous Line            Peripheral IV 18 Left Forearm less than 1 day                Physical Exam  GEN: NAD, alert and oriented, well appearing  SKIN: dry without significant lesions or rashes  HEENT: NCAT, PERRL, EOMs intact  NECK: No JVD appreciated  CARDIOVASCULAR: RRR, distant S1, S2 without murmurs, rubs, or gallops appreciated  LUNGS: Clear to auscultation bilaterally anteriorly without wheezes, rhonchi, or rales  ABDOMEN: Soft, nontender, nondistended  EXTREMITIES/VASCULAR: perfused without clubbing or cyanosis; +LE edema b/l, compression stockings in place  PSYCH: Normal mood and affect  NEURO: CN ll-Xll grossly intact      Lab Results: I have personally reviewed pertinent lab results  Results from last 7 days  Lab Units 18  0748   WBC Thousand/uL 9 24   HEMOGLOBIN g/dL 12 5   HEMATOCRIT % 38 3   PLATELETS Thousands/uL 317           Results from last 7 days  Lab Units 18  0747   INR  1 08             Imaging: I have personally reviewed pertinent reports      Results for orders placed in visit on 01/25/15   Echo complete with contrast if indicated    N[facility]ative [facility] [address] [place] [address], [address] [place] [address]   Phone: [phone]   TRANSTHORACIC ECHOCARDIOGRAM   2D, M-MODE, DOPPLER, AND COLOR DOPPLER   Study date:  2015   Patient: Blaze Oropeza   MR number: [id number]   Account number: [de-identified]   : 1930   Age: 80 years   Gender: Female   Status: Inpatient   Location: Bedside   Height: 64 in   Weight: 174 7 lb   BP: 160/ 74 mmHg   Indications: Syncope   Diagnoses: 780 2 - SYNCOPE AND COLLAPSE   Sonographer:  GREGORIO Kirk   Primary Physician:  Burke Rucker DO   Referring Physician:  Cici Jay MD   Group:  [facility]   Interpreting Physician:  DO KEVIN Faustin   LEFT VENTRICLE:   Systolic function was normal  Ejection fraction was estimated in the range of   65 % to 70 %  There were no regional wall motion abnormalities  MITRAL VALVE:   There was mild regurgitation  AORTIC VALVE:   There was mild regurgitation  TRICUSPID VALVE:   There was mild regurgitation  AORTA:   The root exhibited mild dilatation  HISTORY: PRIOR HISTORY: HTN, HLD, Dementia, Pacemaker, Afib, Melanoma, Chest   pain   PROCEDURE: The procedure was performed at the bedside  This was a routine   TRANSTHORACIC ECHOCARDIOGRAM   Patient: Jeremias Mayen   MR number: [id number]    ------ Page 2   study  The transthoracic approach was used  The study included complete 2D   imaging, M-mode, complete spectral Doppler, and color Doppler  The heart rate   was 60 bpm, at the start of the study  Images were obtained from the   parasternal, apical, subcostal, and suprasternal notch acoustic windows  Echocardiographic views were limited due to lung interference  This was a   technically difficult study  LEFT VENTRICLE: Size was normal  Systolic function was normal  Ejection   fraction was estimated in the range of 65 % to 70 %  There were no regional   wall motion abnormalities  Wall thickness was normal  DOPPLER: Left    ventricular   diastolic function parameters were normal for the patient's age  RIGHT VENTRICLE: The size was normal  Systolic function was normal  Wall   thickness was normal  A pacing wire was present  LEFT ATRIUM: Size was normal    RIGHT ATRIUM: Size was normal    MITRAL VALVE: Valve structure was normal  There was mild thickening  There was   normal leaflet separation  DOPPLER: The transmitral velocity was within the   normal range  There was no evidence for stenosis  There was mild    regurgitation  AORTIC VALVE: The valve was trileaflet   Leaflets exhibited mildly increased   thickness, normal cuspal separation, and sclerosis  DOPPLER: Transaortic   velocity was within the normal range  There was no evidence for stenosis  There   was mild regurgitation  TRICUSPID VALVE: The valve structure was normal  There was normal leaflet   separation  DOPPLER: The transtricuspid velocity was within the normal range  There was no evidence for stenosis  There was mild regurgitation  Estimated   peak PA pressure was 35 mmHg  PULMONIC VALVE: Leaflets exhibited normal thickness, no calcification, and   normal cuspal separation  DOPPLER: The transpulmonic velocity was within the   normal range  There was no regurgitation  PERICARDIUM: There was no pericardial effusion  The pericardium was normal in   appearance  AORTA: The root exhibited mild dilatation  SYSTEMIC VEINS: IVC: The inferior vena cava was not well visualized     SYSTEM MEASUREMENT TABLES   2D   %FS: 51 55 %   AV Diam: 3 74 cm   EDV(Teich): 111 71 ml   TRANSTHORACIC ECHOCARDIOGRAM   Patient: Ale Parks   MR number: [id number]    ------ Page 3   EF(Teich): 82 62 %   ESV(Teich): 19 41 ml   IVSd: 0 99 cm   LA Area: 13 49 cm2   LA Diam: 3 12 cm   LVEDV MOD A4C: 61 72 ml   LVEF MOD A4C: 66 8 %   LVESV MOD A4C: 20 49 ml   LVIDd: 4 88 cm   LVIDs: 2 36 cm   LVLd A4C: 8 03 cm   LVLs A4C: 6 84 cm   LVPWd: 1 13 cm   RA Area: 17 98 cm2   RVIDd: 3 24 cm   SV MOD A4C: 41 23 ml   SV(Teich): 92 3 ml   CW   AR Dec [place]: 2 37 m/s2   AR Dec Time: 2122 12 ms   AR PHT: 615 41 ms   AR Vmax: 5 03 m/s   AR maxP 26 mmHg   TR Vmax: 2 69 m/s   TR maxP 02 mmHg   MM   TAPSE: 2 51 cm   PW   MV A Hoang: 0 98 m/s   MV Dec [place]: 3 19 m/s2   MV DecT: 235 97 ms   MV E Hoang: 0 75 m/s   MV E/A Ratio: 0 77   MV PHT: 68 43 ms   MVA By PHT: 3 21 cm2   [employer] Accredited Echocardiography Laboratory   Prepared and electronically signed by   Matt Loja DO   Signed 2015 15:29:57        TELEMETRY: ventricular pacing    Code Status: No Order

## 2018-04-11 NOTE — ANESTHESIA PREPROCEDURE EVALUATION
Review of Systems/Medical History      No history of anesthetic complications     Cardiovascular  Pacemaker/AICD,   Comment: Normal biV systolic function,  Pulmonary  Negative pulmonary ROS        GI/Hepatic  Negative GI/hepatic ROS          Negative  ROS        Endo/Other  Negative endo/other ROS      GYN  Negative gynecology ROS          Hematology  Negative hematology ROS      Musculoskeletal  Negative musculoskeletal ROS        Neurology      Comment: Dementia  vertigo Psychology                Anesthesia Plan  ASA Score- 3     Anesthesia Type- IV sedation with anesthesia with ASA Monitors  Additional Monitors:   Airway Plan:     Comment: IV sedation, GA back up; standard ASA monitors  Risks and benefits discussed with patient; patient consented and agrees to proceed  I saw and evaluated the patient  If seen with CRNA, we have discussed the anesthetic plan and I am in agreement that the plan is appropriate for the patient        Plan Factors-    Induction-     Postoperative Plan-     Informed Consent- Anesthetic plan and risks discussed with patient  I personally reviewed this patient with the CRNA  Discussed and agreed on the Anesthesia Plan with the CRNA  Iesha Stewart

## 2018-04-26 ENCOUNTER — IN-CLINIC DEVICE VISIT (OUTPATIENT)
Dept: CARDIOLOGY CLINIC | Facility: CLINIC | Age: 83
End: 2018-04-26

## 2018-04-26 DIAGNOSIS — Z95.0 PRESENCE OF PERMANENT CARDIAC PACEMAKER: ICD-10-CM

## 2018-04-26 DIAGNOSIS — I44.2 COMPLETE ATRIOVENTRICULAR BLOCK (HCC): Primary | ICD-10-CM

## 2018-04-26 PROCEDURE — 99024 POSTOP FOLLOW-UP VISIT: CPT

## 2018-04-27 NOTE — PROGRESS NOTES
Results for orders placed or performed in visit on 04/26/18   Cardiac EP device report    Narrative    DEVICE INTERROGATED IN THE [facility] OFFICE  BATTERY VOLTAGE ADEQUATE  (12 5 YRS)  AP 69%  99%  ALL LEAD PARAMETERS WITHIN NORMAL LIMITS  NO SIGNIFICANT HIGH RATE EPISODES  NORMAL DEVICE FUNCTION   WOUND CHECK: INCISION CLEAN AND DRY WITH EDGES APPROXIMATED; WOUND CARE AND RESTRICTIONS REVIEWED WITH PATIENT --GERTRUDIS

## 2018-08-08 ENCOUNTER — REMOTE DEVICE CLINIC VISIT (OUTPATIENT)
Dept: CARDIOLOGY CLINIC | Facility: CLINIC | Age: 83
End: 2018-08-08
Payer: MEDICARE

## 2018-08-08 DIAGNOSIS — Z95.0 PRESENCE OF PERMANENT CARDIAC PACEMAKER: ICD-10-CM

## 2018-08-08 DIAGNOSIS — I44.2 CHB (COMPLETE HEART BLOCK) (HCC): Primary | ICD-10-CM

## 2018-08-08 DIAGNOSIS — I49.5 SSS (SICK SINUS SYNDROME) (HCC): ICD-10-CM

## 2018-08-08 PROCEDURE — 93296 REM INTERROG EVL PM/IDS: CPT | Performed by: INTERNAL MEDICINE

## 2018-08-08 PROCEDURE — 93294 REM INTERROG EVL PM/LDLS PM: CPT | Performed by: INTERNAL MEDICINE

## 2018-08-08 NOTE — PROGRESS NOTES
MDT DDD PPM  [employer]ELINK TRANSMISSION:  BATTERY VOLTAGE ADEQUATE (12 1 YR)    AP 75 9%  99 8% (CHB)   ALL AVAILABLE LEAD PARAMETERS WITHIN NORMAL LIMITS   NO SIGNIFICANT HIGH RATE EPISODES   NORMAL DEVICE FUNCTION   RG

## 2019-05-01 ENCOUNTER — IN-CLINIC DEVICE VISIT (OUTPATIENT)
Dept: CARDIOLOGY CLINIC | Facility: CLINIC | Age: 84
End: 2019-05-01
Payer: MEDICARE

## 2019-05-01 DIAGNOSIS — I49.5 SICK SINUS SYNDROME (HCC): ICD-10-CM

## 2019-05-01 DIAGNOSIS — Z95.0 PRESENCE OF PERMANENT CARDIAC PACEMAKER: ICD-10-CM

## 2019-05-01 DIAGNOSIS — I44.2 COMPLETE ATRIOVENTRICULAR BLOCK (HCC): Primary | ICD-10-CM

## 2019-05-01 PROCEDURE — 93280 PM DEVICE PROGR EVAL DUAL: CPT | Performed by: INTERNAL MEDICINE

## 2019-08-06 ENCOUNTER — REMOTE DEVICE CLINIC VISIT (OUTPATIENT)
Dept: CARDIOLOGY CLINIC | Facility: CLINIC | Age: 84
End: 2019-08-06
Payer: MEDICARE

## 2019-08-06 DIAGNOSIS — Z95.0 PRESENCE OF CARDIAC PACEMAKER: Primary | ICD-10-CM

## 2019-08-06 PROCEDURE — 93294 REM INTERROG EVL PM/LDLS PM: CPT | Performed by: INTERNAL MEDICINE

## 2019-08-06 PROCEDURE — 93296 REM INTERROG EVL PM/IDS: CPT | Performed by: INTERNAL MEDICINE

## 2019-08-06 NOTE — PROGRESS NOTES
Results for orders placed or performed in visit on 08/06/19   Cardiac EP device report    Narrative    MDT DDD PPM  [employer]INK TRANSMISSION: BATTERY VOLTAGE ADEQUATE (11 1 YRS)  AP: 71 5%  : 98 6% (>40% DEPENDENT)  ALL AVAILABLE LEAD PARAMETERS WITHIN NORMAL LIMITS  NO SIGNIFICANT HIGH RATE EPISODES  PACEMAKER FUNCTIONING APPROPRIATELY    [address] [place] [address]

## 2019-11-05 ENCOUNTER — REMOTE DEVICE CLINIC VISIT (OUTPATIENT)
Dept: CARDIOLOGY CLINIC | Facility: CLINIC | Age: 84
End: 2019-11-05
Payer: MEDICARE

## 2019-11-05 DIAGNOSIS — Z95.0 PRESENCE OF CARDIAC PACEMAKER: Primary | ICD-10-CM

## 2019-11-05 PROCEDURE — 93296 REM INTERROG EVL PM/IDS: CPT | Performed by: INTERNAL MEDICINE

## 2019-11-05 PROCEDURE — 93294 REM INTERROG EVL PM/LDLS PM: CPT | Performed by: INTERNAL MEDICINE

## 2019-11-05 NOTE — PROGRESS NOTES
Results for orders placed or performed in visit on 11/05/19   Cardiac EP device report    Narrative    MDT DDD PPM  [employer]ELINK TRANSMISSION: BATTERY VOLTAGE ADEQUATE (10 9 YRS)  AP: 72 1%  : 99 7% (>40%~MVP-OFF/AVB)  ALL AVAILABLE LEAD PARAMETERS WITHIN NORMAL LIMITS  NO SIGNIFICANT HIGH RATE EPISODES  PACEMAKER FUNCTIONING APPROPRIATELY    [address] [place] [address]

## 2020-02-04 ENCOUNTER — REMOTE DEVICE CLINIC VISIT (OUTPATIENT)
Dept: CARDIOLOGY CLINIC | Facility: CLINIC | Age: 85
End: 2020-02-04
Payer: MEDICARE

## 2020-02-04 DIAGNOSIS — Z95.0 PRESENCE OF PERMANENT CARDIAC PACEMAKER: Primary | ICD-10-CM

## 2020-02-04 PROCEDURE — 93296 REM INTERROG EVL PM/IDS: CPT | Performed by: INTERNAL MEDICINE

## 2020-02-04 PROCEDURE — 93294 REM INTERROG EVL PM/LDLS PM: CPT | Performed by: INTERNAL MEDICINE

## 2020-02-05 NOTE — PROGRESS NOTES
Results for orders placed or performed in visit on 02/04/20   Cardiac EP device report    Narrative    MDT DDD PPM  CARELINK TRANSMISSION: BATTERY VOLTAGE ADEQUATE  (10 8 YRS) AP 64%  99%  ALL AVAILABLE LEAD PARAMETERS WITHIN NORMAL LIMITS  30 TREATED AT/AF EPISODES DETECTED  33 AT/AF EPISODES (1% OF TIME) LONGEST <6 HOURS)  PATIENT IS ON METOPROLOL AND ASA  NO AC'S MADE DR Navya Plaza  NORMAL DEVICE FUNCTION  ---CABA

## 2023-12-08 NOTE — PROGRESS NOTES
"  Discussion/Summary  Normal device function      Results/Data  Results   Cardiac Device TTM 28Jan2016 04:13PM Melina Hazard     Test Name Result Flag Reference   Cardiac Electrophysiology Report      [id number]  pdf   DEVICE TYPE Pacemaker     MISCELLANEOUS COMMENT      NON-MAGNET= VENTRICULAR PACING WITH CAPTURE WITH SOME AV PACING NOTED  MAGNET= ASYNCHRONOUS A-V PACING WITH CAPTURE  POST-MAGNET= VENTRICULAR PACING WITH CAPTURE WITH SOME AV PACING NOTED  NORMAL DEVICE FUNCTION   DL/NC     Signatures   Electronically signed by : Nydia Bhagat, ; Jan 28 2016 11:27AM EST                       (Author)    Electronically signed by : Susan Shaw DO; May  1 2016  7:35PM EST                       (Author)    " Where Is Your Acne Located?: Face